# Patient Record
Sex: FEMALE | Race: WHITE | NOT HISPANIC OR LATINO | Employment: OTHER | RURAL
[De-identification: names, ages, dates, MRNs, and addresses within clinical notes are randomized per-mention and may not be internally consistent; named-entity substitution may affect disease eponyms.]

---

## 2022-05-30 ENCOUNTER — HOSPITAL ENCOUNTER (EMERGENCY)
Facility: HOSPITAL | Age: 77
Discharge: HOME | End: 2022-05-30
Attending: FAMILY MEDICINE
Payer: MEDICARE

## 2022-05-30 VITALS
RESPIRATION RATE: 16 BRPM | WEIGHT: 177 LBS | SYSTOLIC BLOOD PRESSURE: 169 MMHG | DIASTOLIC BLOOD PRESSURE: 56 MMHG | HEIGHT: 65 IN | BODY MASS INDEX: 29.49 KG/M2 | HEART RATE: 75 BPM | OXYGEN SATURATION: 98 % | TEMPERATURE: 98 F

## 2022-05-30 DIAGNOSIS — S52.509A CLOSED FRACTURE OF DISTAL END OF RADIUS, UNSPECIFIED FRACTURE MORPHOLOGY, UNSPECIFIED LATERALITY, INITIAL ENCOUNTER: Primary | ICD-10-CM

## 2022-05-30 DIAGNOSIS — S52.616A: ICD-10-CM
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
## 2022-05-30 DIAGNOSIS — M25.539 WRIST PAIN: ICD-10-CM

## 2022-05-30 PROCEDURE — 99285 EMERGENCY DEPT VISIT HI MDM: CPT | Mod: 25

## 2022-05-30 PROCEDURE — 63600175 PHARM REV CODE 636 W HCPCS: Performed by: NURSE PRACTITIONER

## 2022-05-30 PROCEDURE — 96375 TX/PRO/DX INJ NEW DRUG ADDON: CPT

## 2022-05-30 PROCEDURE — 99284 EMERGENCY DEPT VISIT MOD MDM: CPT | Mod: 57,,, | Performed by: FAMILY MEDICINE

## 2022-05-30 PROCEDURE — 25605 PR CLOSED RX DIST RAD/ULNA FX,MANIPUL: ICD-10-PCS | Mod: LT,,, | Performed by: FAMILY MEDICINE

## 2022-05-30 PROCEDURE — 25605 CLTX DST RDL FX/EPHYS SEP W/: CPT | Mod: LT,,, | Performed by: FAMILY MEDICINE

## 2022-05-30 PROCEDURE — 25000003 PHARM REV CODE 250: Performed by: FAMILY MEDICINE

## 2022-05-30 PROCEDURE — 99284 PR EMERGENCY DEPT VISIT,LEVEL IV: ICD-10-PCS | Mod: 57,,, | Performed by: FAMILY MEDICINE

## 2022-05-30 PROCEDURE — 25605 CLTX DST RDL FX/EPHYS SEP W/: CPT

## 2022-05-30 PROCEDURE — 25000003 PHARM REV CODE 250

## 2022-05-30 PROCEDURE — 63600175 PHARM REV CODE 636 W HCPCS: Performed by: FAMILY MEDICINE

## 2022-05-30 PROCEDURE — 96374 THER/PROPH/DIAG INJ IV PUSH: CPT | Mod: 59

## 2022-05-30 RX ORDER — HYDROCODONE BITARTRATE AND ACETAMINOPHEN 5; 325 MG/1; MG/1
1 TABLET ORAL EVERY 4 HOURS PRN
Qty: 12 TABLET | Refills: 0 | Status: SHIPPED | OUTPATIENT
Start: 2022-05-30 | End: 2022-05-30 | Stop reason: SDUPTHER

## 2022-05-30 RX ORDER — MORPHINE SULFATE 4 MG/ML
4 INJECTION, SOLUTION INTRAMUSCULAR; INTRAVENOUS
Status: COMPLETED | OUTPATIENT
Start: 2022-05-30 | End: 2022-05-30

## 2022-05-30 RX ORDER — HYDROCODONE BITARTRATE AND ACETAMINOPHEN 10; 325 MG/1; MG/1
1 TABLET ORAL EVERY 6 HOURS PRN
Qty: 20 TABLET | Refills: 0 | Status: SHIPPED | OUTPATIENT
Start: 2022-05-30

## 2022-05-30 RX ORDER — INSULIN GLARGINE 100 [IU]/ML
42 INJECTION, SOLUTION SUBCUTANEOUS
COMMUNITY
Start: 2022-05-03

## 2022-05-30 RX ORDER — PROPOFOL 10 MG/ML
100 VIAL (ML) INTRAVENOUS ONCE
Status: COMPLETED | OUTPATIENT
Start: 2022-05-30 | End: 2022-05-30

## 2022-05-30 RX ORDER — EZETIMIBE 10 MG/1
10 TABLET ORAL DAILY
COMMUNITY
Start: 2022-04-08

## 2022-05-30 RX ORDER — SODIUM CHLORIDE 9 MG/ML
INJECTION, SOLUTION INTRAVENOUS
Status: COMPLETED
Start: 2022-05-30 | End: 2022-05-30

## 2022-05-30 RX ORDER — SIMVASTATIN 40 MG/1
40 TABLET, FILM COATED ORAL NIGHTLY
COMMUNITY
Start: 2022-04-08

## 2022-05-30 RX ORDER — DULAGLUTIDE 1.5 MG/.5ML
INJECTION, SOLUTION SUBCUTANEOUS
COMMUNITY
Start: 2022-05-02

## 2022-05-30 RX ORDER — ONDANSETRON 2 MG/ML
4 INJECTION INTRAMUSCULAR; INTRAVENOUS
Status: COMPLETED | OUTPATIENT
Start: 2022-05-30 | End: 2022-05-30

## 2022-05-30 RX ORDER — ONDANSETRON 2 MG/ML
4 INJECTION INTRAMUSCULAR; INTRAVENOUS
Status: DISCONTINUED | OUTPATIENT
Start: 2022-05-30 | End: 2022-05-30

## 2022-05-30 RX ORDER — HYDROCODONE BITARTRATE AND ACETAMINOPHEN 5; 325 MG/1; MG/1
2 TABLET ORAL
Status: COMPLETED | OUTPATIENT
Start: 2022-05-30 | End: 2022-05-30

## 2022-05-30 RX ORDER — BRIMONIDINE TARTRATE, TIMOLOL MALEATE 2; 5 MG/ML; MG/ML
SOLUTION/ DROPS OPHTHALMIC
COMMUNITY
Start: 2022-02-21

## 2022-05-30 RX ORDER — CALCITRIOL 0.25 UG/1
CAPSULE ORAL
COMMUNITY
Start: 2022-03-16

## 2022-05-30 RX ORDER — LISINOPRIL 10 MG/1
10 TABLET ORAL DAILY
COMMUNITY
Start: 2022-04-12

## 2022-05-30 RX ADMIN — ONDANSETRON 4 MG: 2 INJECTION INTRAMUSCULAR; INTRAVENOUS at 03:05

## 2022-05-30 RX ADMIN — PROPOFOL 60 MG: 10 INJECTION, EMULSION INTRAVENOUS at 04:05

## 2022-05-30 RX ADMIN — HYDROCODONE BITARTRATE AND ACETAMINOPHEN 2 TABLET: 5; 325 TABLET ORAL at 05:05

## 2022-05-30 RX ADMIN — SODIUM CHLORIDE 1000 ML: 9 INJECTION, SOLUTION INTRAVENOUS at 04:05

## 2022-05-30 RX ADMIN — MORPHINE SULFATE 4 MG: 4 INJECTION INTRAVENOUS at 03:05

## 2022-05-30 NOTE — DISCHARGE INSTRUCTIONS
Wear splint as directed. Follow up with an orthopedist in 1-2 weeks. Follow up with your primary care provider in 2 days. Return to the emergency department for any increase in symptoms or for any other new or worrisome symptoms.

## 2022-05-30 NOTE — PROVIDER PROGRESS NOTES - EMERGENCY DEPT.
Encounter Date: 5/30/2022    ED Physician Progress Notes        Discussed with . Will reduce, splint in sugartong, and follow up with ortho in 1-2 weeks

## 2022-05-30 NOTE — ED PROVIDER NOTES
Encounter Date: 5/30/2022    SCRIBE #1 NOTE: I, Sandi Brian, am scribing for, and in the presence of,  Monet Chavarria MD. I have scribed the following portions of the note - Other sections scribed: Procudure.       History     Chief Complaint   Patient presents with    Fall     76 year old female presents to the emergency department to be evaluated for pain to her left forearm. She was standing in the back of the camper as the  was pulling out at the gas station, she told him to stop so she could sit down, and she fell forward onto her left forearm. She hit her head. Denies neck pain, back pain, loss of consciousness, chest pain, shortness of breath, abdominal pain.     The history is provided by the patient.   Fall  The accident occurred just prior to arrival. She landed on a hard floor. Associated symptoms include headaches. Pertinent negatives include no neck pain, no back pain, no paresthesias, no paralysis, no visual change, no fever, no numbness, no abdominal pain, no bowel incontinence, no nausea, no vomiting, no hematuria, no hearing loss, no loss of consciousness and no tingling.     Review of patient's allergies indicates:   Allergen Reactions    Atorvastatin     Bee's [allergen ext-venom-honey bee]     Codeine     Empagliflozin     Glipizide     Pcn [penicillins]     Shellfish containing products     Strawberry     Sulfa (sulfonamide antibiotics)      Past Medical History:   Diagnosis Date    Diabetes mellitus     Hypertension      Past Surgical History:   Procedure Laterality Date    CHOLECYSTECTOMY      HYSTERECTOMY       History reviewed. No pertinent family history.  Social History     Tobacco Use    Smoking status: Never Smoker    Smokeless tobacco: Never Used     Review of Systems   Constitutional: Negative for fever.   Gastrointestinal: Negative for abdominal pain, bowel incontinence, nausea and vomiting.   Genitourinary: Negative for hematuria.   Musculoskeletal:  Negative for back pain and neck pain.   Neurological: Positive for headaches. Negative for tingling, loss of consciousness, numbness and paresthesias.   All other systems reviewed and are negative.      Physical Exam     Initial Vitals [05/30/22 1457]   BP Pulse Resp Temp SpO2   (!) 170/73 82 19 98.4 °F (36.9 °C) 100 %      MAP       --         Physical Exam    Vitals reviewed.  Constitutional: She appears well-developed and well-nourished.   HENT:   Head: Normocephalic and atraumatic.   Eyes: EOM are normal. Pupils are equal, round, and reactive to light.   Neck: Neck supple.   Cardiovascular: Normal rate and regular rhythm.   Pulmonary/Chest: Breath sounds normal.   Abdominal: Abdomen is soft. Bowel sounds are normal. She exhibits no distension and no mass. There is no abdominal tenderness. There is no rebound and no guarding.   Musculoskeletal:      Left upper arm: Tenderness present. No swelling, edema, deformity or lacerations.      Left elbow: Normal.      Left forearm: Tenderness present. No swelling, edema, deformity or lacerations.      Left wrist: Tenderness present. No swelling, deformity, effusion, lacerations, snuff box tenderness or crepitus. Normal range of motion. Normal pulse.      Left hand: Tenderness present. No swelling, deformity, lacerations or bony tenderness. Normal range of motion. Normal strength. Normal sensation. There is no disruption of two-point discrimination. Normal capillary refill. Normal pulse.      Cervical back: Normal and neck supple.      Thoracic back: Normal.      Lumbar back: Normal.      Right hip: Normal.      Left hip: Normal.      Right upper leg: Normal.      Left upper leg: Normal.      Right lower leg: Normal.      Left lower leg: Normal.     Neurological: She is alert and oriented to person, place, and time. She has normal strength. GCS score is 15. GCS eye subscore is 4. GCS verbal subscore is 5. GCS motor subscore is 6.   Skin: Skin is warm and dry. Capillary  refill takes less than 2 seconds.   Psychiatric: She has a normal mood and affect.         Medical Screening Exam   See Full Note    ED Course   Orthopedic Injury    Date/Time: 2022 4:29 PM  Performed by: Monet Palomino MD  Authorized by: Monet Palomino MD     Location procedure was performed:  [facility] EMERGENCY DEPARTMENT  Consent Done?:  Yes  Universal Protocol:     Verbal consent obtained?: Yes      Written consent obtained?: Yes      Consent given by:  Patient    Patient identity confirmed:  , name, MRN and verbally with patient  Injury:     Injury location:  Wrist    Location details:  Left wrist    Injury type:  Fracture    Fracture type: distal radius      Fracture type: distal radius        Pre-procedure assessment:     Neurovascular status: Neurovascularly intact      Distal perfusion: normal      Neurological function: normal      Range of motion: reduced      Local anesthesia used?: No      Patient sedated?: Yes      ASA Class:  Class 1 - Heathy patient. No medical history.    Mallampati Score:  Class 2 - Visualization of the soft palate, fauces, and uvula.    Patient/Family history of anesthesia or sedation complications: No      Sedation type: deep sedation      Sedation:  Propofol    Vital signs: Vital signs monitored during sedation        Selections made in this section will also lock the Injury type section above.:     Immobilization:  Splint    Complications: No      Specimens: No      Implants: No    Post-procedure assessment:     Distal perfusion: normal      Neurological function: normal      Range of motion: splinted      Patient tolerance:  Patient tolerated the procedure well with no immediate complications     Dr. Bingham present alongside Dr. Palomino throughout the duration of the sedation.       Labs Reviewed - No data to display       Imaging Results          X-Ray Wrist Complete Left (Final result)  Result time 22 17:16:02    Final result by Grey Middleton MD  (05/30/22 17:16:02)                 Impression:      Relatively good alignment and positioning of the fractures of the distal radius and ulna following closed reduction      Electronically signed by: Grey Middleton  Date:    05/30/2022  Time:    17:16             Narrative:    EXAMINATION:  XR WRIST COMPLETE 3 VIEWS LEFT    CLINICAL HISTORY:  .  Pain in unspecified wrist    COMPARISON:  Pre reduction films 5/30/2022    TECHNIQUE:  AP, lateral, and oblique views of the left wrist in cast    FINDINGS:  There is relatively good alignment and positioning of the acute comminuted fracture of the distal left radial metaphysis and epiphysis and the acute fracture of the ulnar styloid process following closed reduction.  There is slight obliquity on the lateral view, limiting evaluation.  There is superimposed cast material                               X-Ray Hand 3 view Left (Final result)  Result time 05/30/22 15:51:58    Final result by Grey Middleton MD (05/30/22 15:51:58)                 Impression:      Acute fractures of the distal left radius and ulnar styloid process as discussed above      Electronically signed by: Grey Middleton  Date:    05/30/2022  Time:    15:51             Narrative:    EXAMINATION:  XR FOREARM LEFT; XR HAND COMPLETE 3 VIEW LEFT    CLINICAL HISTORY:  fall;.  Unspecified fall, initial encounter    COMPARISON:  No previous similar    TECHNIQUE:  AP, lateral, and oblique views left hand.  AP and lateral views left forearm    FINDINGS:  Left hand: There is osteopenia.  There is a presumed acute comminuted fracture of the distal radial metaphysis and epiphysis with mild displacement of fracture fragments and mild to moderate dorsal distal angulation of the main distal fracture fragment.  There is a nondisplaced oblique fracture of the ulnar styloid process with relatively good alignment.  No additional fractures of the hand level.  There is scattered mild osteophyte formation and joint  space narrowing of the interphalangeal joints.  There is moderate osteophyte formation and joint space narrowing at the 1st carpometacarpal joint.    Left forearm two views: No additional fractures of the forearm                               X-Ray Humerus 2 View Left (Final result)  Result time 05/30/22 15:48:19    Final result by Grey Middleton MD (05/30/22 15:48:19)                 Impression:      No acute fracture    Osteopenia    Osteoarthritis of the shoulder      Electronically signed by: Grey Middleton  Date:    05/30/2022  Time:    15:48             Narrative:    EXAMINATION:  XR HUMERUS 2 VIEW LEFT    CLINICAL HISTORY:  .  Unspecified fall, initial encounter    COMPARISON:  No previous similar    TECHNIQUE:  Left humerus AP and lateral    FINDINGS:  There is no acute fracture or dislocation.  There is mild osteophyte formation and joint space narrowing of the acromioclavicular joint.  There is mild-to-moderate osteophyte formation of the glenohumeral joint.  Osteopenia                               X-Ray Forearm Left (Final result)  Result time 05/30/22 15:51:58    Final result by Grey Middleton MD (05/30/22 15:51:58)                 Impression:      Acute fractures of the distal left radius and ulnar styloid process as discussed above      Electronically signed by: Grey Middleton  Date:    05/30/2022  Time:    15:51             Narrative:    EXAMINATION:  XR FOREARM LEFT; XR HAND COMPLETE 3 VIEW LEFT    CLINICAL HISTORY:  fall;.  Unspecified fall, initial encounter    COMPARISON:  No previous similar    TECHNIQUE:  AP, lateral, and oblique views left hand.  AP and lateral views left forearm    FINDINGS:  Left hand: There is osteopenia.  There is a presumed acute comminuted fracture of the distal radial metaphysis and epiphysis with mild displacement of fracture fragments and mild to moderate dorsal distal angulation of the main distal fracture fragment.  There is a nondisplaced oblique  fracture of the ulnar styloid process with relatively good alignment.  No additional fractures of the hand level.  There is scattered mild osteophyte formation and joint space narrowing of the interphalangeal joints.  There is moderate osteophyte formation and joint space narrowing at the 1st carpometacarpal joint.    Left forearm two views: No additional fractures of the forearm                               CT Head Without Contrast (Final result)  Result time 05/30/22 15:25:48    Final result by Grey Middleton MD (05/30/22 15:25:48)                 Impression:      No acute intracranial process      Electronically signed by: Grey Middleton  Date:    05/30/2022  Time:    15:25             Narrative:    EXAMINATION:  CT head without contrast    CLINICAL HISTORY:  Trauma.  Fall    TECHNIQUE:  Transaxial CT sections were obtained through the brain without contrast.    The CT examination was performed using one or more of the following dose reduction techniques: Automated exposure control, adjustment of the mA and kV according to patient's size, use of acute or iterative reconstruction techniques.    COMPARISON:  No previous similar    FINDINGS:  The ventricles are midline in position without evidence of hydrocephalus. There is no mass or area of parenchymal hemorrhage. There is no gross CT evidence of acute cortical stroke. There is no extra-axial hematoma. The partially visualized sinuses are generally clear. There is no obvious skull fracture.  There is moderate distal internal carotid artery calcification bilaterally.                                 Medications   morphine injection 4 mg (4 mg Intravenous Given 5/30/22 1542)   ondansetron injection 4 mg (4 mg Intravenous Given 5/30/22 1543)   propofol (DIPRIVAN) 10 mg/mL IVP (60 mg Intravenous Given 5/30/22 1634)   sodium chloride 0.9% bolus 1,000 mL (0 mLs Intravenous Stopped 5/30/22 1642)   HYDROcodone-acetaminophen 5-325 mg per tablet 2 tablet (2 tablets  Oral Given 5/30/22 8742)                Attending Attestation:           Physician Attestation for Scribe:  Physician Attestation Statement for Scribe #1: I, Monet Chavarria MD, reviewed documentation, as scribed by Sandi Brian in my presence, and it is both accurate and complete.                   Clinical Impression:   Final diagnoses:  [W19.XXXA] Fall  [S52.509A] Closed fracture of distal end of radius, unspecified fracture morphology, unspecified laterality, initial encounter (Primary)  [S52.476A] Closed nondisplaced fracture of styloid process of ulna, unspecified laterality, initial encounter  [M25.539] Wrist pain          ED Disposition Condition    Discharge Stable        ED Prescriptions     Medication Sig Dispense Start Date End Date Auth. Provider    HYDROcodone-acetaminophen (NORCO) 5-325 mg per tablet  (Status: Discontinued) Take 1 tablet by mouth every 4 (four) hours as needed for Pain. 12 tablet 5/30/2022 5/30/2022 JAYCEE Jacob    HYDROcodone-acetaminophen (NORCO)  mg per tablet Take 1 tablet by mouth every 6 (six) hours as needed for Pain. 20 tablet 5/30/2022  Monet Palomino MD        Follow-up Information    None          Monet Palomino MD  05/31/22 [id number]